# Patient Record
Sex: MALE | Race: WHITE | Employment: UNEMPLOYED | URBAN - METROPOLITAN AREA
[De-identification: names, ages, dates, MRNs, and addresses within clinical notes are randomized per-mention and may not be internally consistent; named-entity substitution may affect disease eponyms.]

---

## 2017-09-05 ENCOUNTER — APPOINTMENT (OUTPATIENT)
Dept: OCCUPATIONAL MEDICINE | Age: 22
End: 2017-09-05
Attending: EMERGENCY MEDICINE

## 2020-06-17 ENCOUNTER — HOSPITAL ENCOUNTER (EMERGENCY)
Facility: HOSPITAL | Age: 25
Discharge: HOME | End: 2020-06-17
Attending: PHYSICIAN ASSISTANT
Payer: OTHER GOVERNMENT

## 2020-06-17 VITALS
DIASTOLIC BLOOD PRESSURE: 67 MMHG | TEMPERATURE: 98 F | RESPIRATION RATE: 16 BRPM | WEIGHT: 160 LBS | HEIGHT: 66 IN | SYSTOLIC BLOOD PRESSURE: 131 MMHG | BODY MASS INDEX: 25.71 KG/M2 | OXYGEN SATURATION: 100 % | HEART RATE: 59 BPM

## 2020-06-17 DIAGNOSIS — Z20.822 CLOSE EXPOSURE TO COVID-19 VIRUS: ICD-10-CM

## 2020-06-17 DIAGNOSIS — Z20.822 COVID-19 VIRUS NOT DETECTED: Primary | ICD-10-CM

## 2020-06-17 PROCEDURE — 99283 EMERGENCY DEPT VISIT LOW MDM: CPT

## 2020-06-18 NOTE — ED INITIAL ASSESSMENT (HPI)
Pt was on a week vacation with a friend who was found to be +COVID. Pt requesting testing. Pt denies sx.

## 2020-06-18 NOTE — ED PROVIDER NOTES
Patient Seen in: [facility] AND [facility] Emergency Department    History   Patient presents with:  Testing    Stated Complaint: Requesting COVID testing    HPI    64-year-old male presents to the emergency department requesting COVID-19 testing.   Patient state Conjunctiva are within normal limits. ENT: Pharynx noninjected. Tonsils within normal size limits bilaterally. No tonsillar exudates. TMs within normal limits bilaterally. Mucous membranes moist.  Neck: The neck is supple.   There is no evidence of JV this patient.

## 2020-06-22 ENCOUNTER — HOSPITAL ENCOUNTER (EMERGENCY)
Facility: HOSPITAL | Age: 25
Discharge: HOME | End: 2020-06-22
Payer: OTHER GOVERNMENT

## 2020-06-22 VITALS
RESPIRATION RATE: 16 BRPM | SYSTOLIC BLOOD PRESSURE: 119 MMHG | DIASTOLIC BLOOD PRESSURE: 70 MMHG | HEART RATE: 77 BPM | OXYGEN SATURATION: 97 % | TEMPERATURE: 98 F

## 2020-06-22 DIAGNOSIS — Z20.822 CLOSE EXPOSURE TO COVID-19 VIRUS: Primary | ICD-10-CM

## 2020-06-22 PROCEDURE — 99283 EMERGENCY DEPT VISIT LOW MDM: CPT

## 2020-06-22 NOTE — ED PROVIDER NOTES
Patient Seen in: [facility] Emergency Department      History   Patient presents with:  Testing    Stated Complaint:     HPI    81-year-old male presents to the emergency department requesting COVID testing.   He states he was seen in the emergency is here with friends who are positive COVID advised to self quarantine for 2 weeks regardless of the negative COVID testing if symptomatic strict return precautions given follow-up with PMD advised        MDM                   Disposition and Plan     Clin

## 2020-06-22 NOTE — ED INITIAL ASSESSMENT (HPI)
Seen in Ed last week for COVID test, was negative. Pt states he was in [place] last week and +COVID exposures. Here for repeat test. Now states sore throat, headache and abd pain.